# Patient Record
(demographics unavailable — no encounter records)

---

## 2025-02-13 NOTE — HISTORY OF PRESENT ILLNESS
[Patient reported mammogram was normal] : Patient reported mammogram was normal [Patient reported breast sonogram was normal] : Patient reported breast sonogram was normal [Patient reported PAP Smear was normal] : Patient reported PAP Smear was normal [FreeTextEntry1] : 41 y/o  (occasional light menses w/ mirena IUD) presents for discussion of breast MRI.  Considering iud removal soon, has frozen embryos banked.  w/ vasectomy. Has appt with STEFFANY. No complaints today.   Reviewed mammogram and sonogram from 2024: The patient has a history of bilateral breast augmentation in 2019 - Revision. Silicone. The patient has no family history of breast cancer. RISK ASSESSMENT: Linus-Maria C Lifetime Risk: 20.3% BIRADS 2  OB History:  C/S, 2017 C/S (placenta previa); 2018 c/s  GYN: Cervical Dysplasia  Duration: 1 Sexually Active   Type of Contraception: Mirena  PMH:  Hemorrhoids  Accepts blood products  Surgical History: C/S x 3, LEEP , "neck" surgery 2024.   Allergies: Latex sensitivity  Current Medications: spironolactone 50, valtrex prn, doxy, adderall 15  Fam HX comments: Mother Hysterectomy for DUB  +vaping 3x/month, denies etoh  [Mammogramdate] : 7/2024 [BreastSonogramDate] : 7/2024 [PapSmeardate] : 6/2024 Bone Condition

## 2025-02-13 NOTE — PLAN
[FreeTextEntry1] : Increased TC risk score on mammogram/sonogram 7/2024  -Reviewed mammogram and sonogram from 7/1/2024: The patient has a history of bilateral breast augmentation in 2019 - Revision. Silicone. The patient has no family history of breast cancer. RISK ASSESSMENT: Hieu Lifetime Risk: 20.3% BIRADS 2 -No contraindication to MRI, no allergy to contrast -Discussed possibility of increased risk of false positive findings -erx generated in [employer], task sent to [facility] for Auth.   rto for iud removal, routine care.

## 2025-04-24 NOTE — PROCEDURE
[IUD Removal] : intrauterine device (IUD) removal [Time out performed] : Pre-procedure time out performed.  Patient's name, date of birth and procedure confirmed. [Consent Obtained] : Consent obtained [Fertility Desired] : fertility desired [ IUD] :  IUD [Risks] : risks [Benefits] : benefits [Alternatives] : alternatives [Patient] : patient [Speculum Placed] : speculum placed [IUD Removed - Forceps] : IUD removed - forceps [IUD Discarded] : IUD discarded [Tolerated Well] : Patient tolerated the procedure well [No Complications] : no complications [Hemostatis Obtained] : hemostatis obtained [Heavy Vaginal Bleeding] : for heavy vaginal bleeding [Pelvic Pain] : for pelvic pain [FreeTextEntry6] : rto for hormone testing if desired. pt wants to also f/u with STEFFANY to assess viability of her embryos. rto for routine care, sooner if needed.